# Patient Record
Sex: MALE | Race: WHITE | NOT HISPANIC OR LATINO | URBAN - METROPOLITAN AREA
[De-identification: names, ages, dates, MRNs, and addresses within clinical notes are randomized per-mention and may not be internally consistent; named-entity substitution may affect disease eponyms.]

---

## 2018-06-12 ENCOUNTER — RECORDS - HEALTHEAST (OUTPATIENT)
Dept: LAB | Facility: CLINIC | Age: 43
End: 2018-06-12

## 2018-06-12 LAB
ALBUMIN SERPL-MCNC: 4.3 G/DL (ref 3.5–5)
ALP SERPL-CCNC: 49 U/L (ref 45–120)
ALT SERPL W P-5'-P-CCNC: 44 U/L (ref 0–45)
ANION GAP SERPL CALCULATED.3IONS-SCNC: 10 MMOL/L (ref 5–18)
AST SERPL W P-5'-P-CCNC: 29 U/L (ref 0–40)
BILIRUB SERPL-MCNC: 0.5 MG/DL (ref 0–1)
BUN SERPL-MCNC: 15 MG/DL (ref 8–22)
CALCIUM SERPL-MCNC: 9.9 MG/DL (ref 8.5–10.5)
CHLORIDE BLD-SCNC: 106 MMOL/L (ref 98–107)
CHOLEST SERPL-MCNC: 167 MG/DL
CO2 SERPL-SCNC: 24 MMOL/L (ref 22–31)
CREAT SERPL-MCNC: 0.95 MG/DL (ref 0.7–1.3)
FASTING STATUS PATIENT QL REPORTED: ABNORMAL
GFR SERPL CREATININE-BSD FRML MDRD: >60 ML/MIN/1.73M2
GLUCOSE BLD-MCNC: 88 MG/DL (ref 70–125)
HDLC SERPL-MCNC: 39 MG/DL
LDLC SERPL CALC-MCNC: 108 MG/DL
POTASSIUM BLD-SCNC: 4.2 MMOL/L (ref 3.5–5)
PROT SERPL-MCNC: 7 G/DL (ref 6–8)
SODIUM SERPL-SCNC: 140 MMOL/L (ref 136–145)
TRIGL SERPL-MCNC: 100 MG/DL

## 2021-03-31 ENCOUNTER — IMMUNIZATION (OUTPATIENT)
Dept: FAMILY MEDICINE | Facility: CLINIC | Age: 46
End: 2021-03-31
Payer: COMMERCIAL

## 2021-03-31 PROCEDURE — 0011A PR COVID VAC MODERNA 100 MCG/0.5 ML IM: CPT

## 2021-03-31 PROCEDURE — 91301 PR COVID VAC MODERNA 100 MCG/0.5 ML IM: CPT

## 2021-04-28 ENCOUNTER — IMMUNIZATION (OUTPATIENT)
Dept: FAMILY MEDICINE | Facility: CLINIC | Age: 46
End: 2021-04-28
Attending: FAMILY MEDICINE
Payer: COMMERCIAL

## 2021-04-28 PROCEDURE — 0012A PR COVID VAC MODERNA 100 MCG/0.5 ML IM: CPT

## 2021-04-28 PROCEDURE — 91301 PR COVID VAC MODERNA 100 MCG/0.5 ML IM: CPT

## 2023-08-21 ENCOUNTER — LAB REQUISITION (OUTPATIENT)
Dept: LAB | Facility: CLINIC | Age: 48
End: 2023-08-21

## 2023-08-21 DIAGNOSIS — I10 ESSENTIAL (PRIMARY) HYPERTENSION: ICD-10-CM

## 2023-08-21 LAB
ALBUMIN SERPL BCG-MCNC: 4.9 G/DL (ref 3.5–5.2)
ALP SERPL-CCNC: 48 U/L (ref 40–129)
ALT SERPL W P-5'-P-CCNC: 66 U/L (ref 0–70)
ANION GAP SERPL CALCULATED.3IONS-SCNC: 14 MMOL/L (ref 7–15)
AST SERPL W P-5'-P-CCNC: 42 U/L (ref 0–45)
BILIRUB SERPL-MCNC: 0.4 MG/DL
BUN SERPL-MCNC: 17.6 MG/DL (ref 6–20)
CALCIUM SERPL-MCNC: 9.6 MG/DL (ref 8.6–10)
CHLORIDE SERPL-SCNC: 106 MMOL/L (ref 98–107)
CHOLEST SERPL-MCNC: 205 MG/DL
CREAT SERPL-MCNC: 0.98 MG/DL (ref 0.67–1.17)
DEPRECATED HCO3 PLAS-SCNC: 22 MMOL/L (ref 22–29)
GFR SERPL CREATININE-BSD FRML MDRD: >90 ML/MIN/1.73M2
GLUCOSE SERPL-MCNC: 85 MG/DL (ref 70–99)
HDLC SERPL-MCNC: 48 MG/DL
LDLC SERPL CALC-MCNC: 125 MG/DL
NONHDLC SERPL-MCNC: 157 MG/DL
POTASSIUM SERPL-SCNC: 3.9 MMOL/L (ref 3.4–5.3)
PROT SERPL-MCNC: 7.3 G/DL (ref 6.4–8.3)
SODIUM SERPL-SCNC: 142 MMOL/L (ref 136–145)
TRIGL SERPL-MCNC: 161 MG/DL

## 2023-08-21 PROCEDURE — 80053 COMPREHEN METABOLIC PANEL: CPT | Performed by: FAMILY MEDICINE

## 2023-08-21 PROCEDURE — 80061 LIPID PANEL: CPT | Performed by: FAMILY MEDICINE

## 2024-09-19 ENCOUNTER — TRANSFERRED RECORDS (OUTPATIENT)
Dept: HEALTH INFORMATION MANAGEMENT | Facility: CLINIC | Age: 49
End: 2024-09-19
Payer: COMMERCIAL

## 2024-10-07 ENCOUNTER — MEDICAL CORRESPONDENCE (OUTPATIENT)
Dept: HEALTH INFORMATION MANAGEMENT | Facility: CLINIC | Age: 49
End: 2024-10-07
Payer: COMMERCIAL

## 2024-10-07 ENCOUNTER — TRANSCRIBE ORDERS (OUTPATIENT)
Dept: OTHER | Age: 49
End: 2024-10-07

## 2024-10-07 DIAGNOSIS — M54.31 RIGHT SIDED SCIATICA: Primary | ICD-10-CM

## 2024-10-21 NOTE — PROGRESS NOTES
ASSESSMENT:  Salvatore Crump is a 49 year old male presents for consultation at the request of Taiwo Ortiz who presents today for new patient evaluation of :         Diagnoses and all orders for this visit:  Spinal stenosis of lumbar region with neurogenic claudication  -     Adult Neurosurgery  Referral; Future  Weakness of right leg  -     Adult Neurosurgery  Referral; Future     Patient presents with atraumatic acute on chronic right sciatica for 6 months. Despite having a history of chronic sciatica, this time he is also presenting with right ankle weakness and foot numbness which are both new in the past 6 months. His exam is notable for weakness for right ankle and great toe dorsiflexion (4-/5) and right ankle plantarflexion (4/5). Pain is currently well controlled, SLR is negative today. Reflexes are symmetric with no upper motor neuron signs. Recent lumbar MRI shows central and subarticular stenosis which are moderate to severe at L2-3 and L3-4, with cauda equina impingement at both levels. Also shows a moderate subarticular encroachment of the right L5 root at L4-5. Clinical presentation is consistent with L4-5/S1 radiculopathy with motor deficit. Given the presence of motor deficit will refer patient to Neurosurgery for an urgent outpatient evaluation.  Care coordination done with neurosurgery team and patient will be seen tomorrow.    PLAN:  Reviewed spine anatomy and disease process. Discussed diagnosis and treatment options with the patient today. A shared decision making model was used. The patient's values and choices were respected. The following represents what was discussed and decided upon by the provider and the patient.    1. DIAGNOSTIC TESTS  No new imaging orders at this time.    2. PHYSICAL THERAPY  Will defer until he is seen by Neurosurgery.   Discussed the importance of core strengthening, ROM, stretching exercises with the patient and how each of these entities is  important in decreasing pain.  Explained to the patient that the purpose of physical therapy is to teach the patient a home exercise program.  These exercises need to be performed every day in order to decrease pain and prevent future occurrences of pain.  Likened it to brushing one's teeth.      3. MEDICATIONS:  Discussed multiple medication options today with patient. Discussed risks, side effects, and proper use of medications. Patient verbalized understanding.  No new medications ordered at this visit.  Patient advised to call our clinic's nurse navigation line (number provided) if they experience any side effects or intolerances with prescribed medication.    4. INTERVENTIONS:  No interventions are recommended at this time pending neurosurgical evaluation    5. OTHER REFERRALS:  A referral was placed to Neurosurgery due to right ankle dorsiflexion and plantarflexion weakness in the setting of sciatica.     6. FOLLOW-UP  After Neurosurgery evaluation.     Advised patient to call the Spine Center if symptoms worsen or you have problems controlling bladder and bowel function.   ______________________________________________________________________    SUBJECTIVE:   Salvatore Crump  is a 49 year old male who presents today for new patient evaluation due to right sciatica for 6 months. Symptoms started when patient was trying to move a couch. He had similar symptoms before but it is more severe now. Reports pain starting in the right buttocks and going down to the posterior leg. Has associated numbness in the bottom of the right foot. Also noticed weakness for right foot dorsiflexion that is interfering with his gait. Tried a couple sessions of chiropractor with no improvement. 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 No bowel.bladder changes.     Severity: Pain is currently mild   Laterality: Right   Radiation: From gluteus to the right posterior leg   Worse with: Walking  "  Better with: Rest   Numbness: Bottom of the right foot   Weakness: Yes, for right ankle dorsiflexion and plantarflexion     No prior back surgeries    -Treatment to Date: a few sessions of chiropractor       Oswestry (FABBY) Questionnaire         No data to display                Neck Disability Index:       No data to display                       -Medications:    Current Outpatient Medications   Medication Sig Dispense Refill    amLODIPine (NORVASC) 10 MG tablet Take 10 mg by mouth daily.      citalopram (CELEXA) 10 MG tablet Take 10 mg by mouth daily.       No current facility-administered medications for this visit.       No Known Allergies    No past medical history on file.     There is no problem list on file for this patient.      No past surgical history on file.    No family history on file.    Reviewed past medical, surgical, and family history with patient found on new patient intake packet located in EMR Media tab.     SOCIAL HX: Reviewed    ROS: Specifically negative for bowel/bladder dysfunction, balance changes, headache, dizziness, foot drop, fevers, chills, appetite changes, nausea/vomiting, unexplained weight loss. Otherwise 13 systems reviewed are negative. Please see the patient's intake questionnaire from today for details.    OBJECTIVE:  BP (!) 162/75 (BP Location: Left arm, Patient Position: Sitting, Cuff Size: Adult Large)   Pulse 78   Ht 6' 1\" (1.854 m)   Wt 285 lb (129.3 kg)   SpO2 97%   BMI 37.60 kg/m      PHYSICAL EXAMINATION:  --CONSTITUTIONAL: Vital signs as above. No acute distress. The patient is well nourished and well groomed.  --PSYCHIATRIC: The patient is awake, alert, oriented to person, place, time and answering questions appropriately with clear speech. Appropriate mood and affect   --RESPIRATORY: Normal rhythm and effort. No abnormal accessory muscle breathing patterns noted.   --GROSS MOTOR: Easily arises from a seated position.  --LUMBAR SPINE: Inspection reveals no " evidence of deformity. Range of motion is not limited in flexion, extension, lateral rotation. Mild tenderness to palpation of lumbar paraspinals, no tenderness in spinous processes.  Facet loading (West test) negative, SLR negative  --HIPS: Full range of motion bilaterally. Negative DILIP and FADIR test.  --LOWER EXTREMITY MOTOR TESTING:  Hip flexion left 5/5, right 5/5  Knee extension left 5/5, right 5/5  Ankle dorsiflexors left 5/5, right 4-/5  Ankle plantarflexors left 5/5, right 4/5   Great toe extension left 5/5, right 4-/5   Knee flexion left 5/5, right 5/5  --NEUROLOGIC: Sensation to lower extremities is intact bilaterally in L2-S1 dermatomes. Reflexes intact in BLE (Patellar +2 bilateral, Achilles trace bilateral), no clonus.  --VASCULAR: Warm upper limbs bilaterally. Capillary refill in the upper extremities is less than 1 second.    RESULTS: Available medical records from [facility] and any other outside records were reviewed today.     Imaging:  Available relevant imaging was personally reviewed and interpreted today. The images were shown to the patient and the findings were explained using a spine model.    Lumbar MRI 09/27/2024 - external   Central and subarticular stenosis which are moderate to severe at L2-3 and L3-4, with cauda equina impingement at both levels.  Moderate subarticular encroachment of the right L5 root at L4-5.  Foraminal stenosis, mild to moderate on the right L2-3 and L4-5, with encroachment of the exiting nerve roots.

## 2024-10-22 ENCOUNTER — OFFICE VISIT (OUTPATIENT)
Dept: PHYSICAL MEDICINE AND REHAB | Facility: CLINIC | Age: 49
End: 2024-10-22
Attending: FAMILY MEDICINE
Payer: COMMERCIAL

## 2024-10-22 VITALS
HEIGHT: 73 IN | WEIGHT: 285 LBS | HEART RATE: 78 BPM | DIASTOLIC BLOOD PRESSURE: 75 MMHG | OXYGEN SATURATION: 97 % | BODY MASS INDEX: 37.77 KG/M2 | SYSTOLIC BLOOD PRESSURE: 162 MMHG

## 2024-10-22 DIAGNOSIS — M48.062 SPINAL STENOSIS OF LUMBAR REGION WITH NEUROGENIC CLAUDICATION: Primary | ICD-10-CM

## 2024-10-22 DIAGNOSIS — R29.898 WEAKNESS OF RIGHT LEG: ICD-10-CM

## 2024-10-22 PROCEDURE — 99203 OFFICE O/P NEW LOW 30 MIN: CPT | Mod: HN | Performed by: STUDENT IN AN ORGANIZED HEALTH CARE EDUCATION/TRAINING PROGRAM

## 2024-10-22 RX ORDER — AMLODIPINE BESYLATE 10 MG/1
10 TABLET ORAL DAILY
COMMUNITY

## 2024-10-22 RX ORDER — CITALOPRAM HYDROBROMIDE 10 MG/1
10 TABLET ORAL DAILY
COMMUNITY

## 2024-10-22 ASSESSMENT — PAIN SCALES - GENERAL: PAINLEVEL: MILD PAIN (2)

## 2024-10-22 NOTE — LETTER
10/22/2024      Salvatore Crump  [address]  [place] [address]      Dear Colleague,    Thank you for referring your patient, Salvatoer Crump, to the [facility]. Please see a copy of my visit note below.    ASSESSMENT:  Salvatore Crump is a 49 year old male presents for consultation at the request of Taiwo Ortiz who presents today for new patient evaluation of :         Diagnoses and all orders for this visit:  Spinal stenosis of lumbar region with neurogenic claudication  -     Adult Neurosurgery  Referral; Future  Weakness of right leg  -     Adult Neurosurgery  Referral; Future     Patient presents with atraumatic acute on chronic right sciatica for 6 months. Despite having a history of chronic sciatica, this time he is also presenting with right ankle weakness and foot numbness which are both new in the past 6 months. His exam is notable for weakness for right ankle and great toe dorsiflexion (4-/5) and right ankle plantarflexion (4/5). Pain is currently well controlled, SLR is negative today. Reflexes are symmetric with no upper motor neuron signs. Recent lumbar MRI shows central and subarticular stenosis which are moderate to severe at L2-3 and L3-4, with cauda equina impingement at both levels. Also shows a moderate subarticular encroachment of the right L5 root at L4-5. Clinical presentation is consistent with L4-5/S1 radiculopathy with motor deficit. Given the presence of motor deficit will refer patient to Neurosurgery for an urgent outpatient evaluation.  Care coordination done with neurosurgery team and patient will be seen tomorrow.    PLAN:  Reviewed spine anatomy and disease process. Discussed diagnosis and treatment options with the patient today. A shared decision making model was used. The patient's values and choices were respected. The following represents what was discussed and decided upon by the provider and the patient.    1.  DIAGNOSTIC TESTS  No new imaging orders at this time.    2. PHYSICAL THERAPY  Will defer until he is seen by Neurosurgery.   Discussed the importance of core strengthening, ROM, stretching exercises with the patient and how each of these entities is important in decreasing pain.  Explained to the patient that the purpose of physical therapy is to teach the patient a home exercise program.  These exercises need to be performed every day in order to decrease pain and prevent future occurrences of pain.  Likened it to brushing one's teeth.      3. MEDICATIONS:  Discussed multiple medication options today with patient. Discussed risks, side effects, and proper use of medications. Patient verbalized understanding.  No new medications ordered at this visit.  Patient advised to call our clinic's nurse navigation line (number provided) if they experience any side effects or intolerances with prescribed medication.    4. INTERVENTIONS:  No interventions are recommended at this time pending neurosurgical evaluation    5. OTHER REFERRALS:  A referral was placed to Neurosurgery due to right ankle dorsiflexion and plantarflexion weakness in the setting of sciatica.     6. FOLLOW-UP  After Neurosurgery evaluation.     Advised patient to call the Spine Center if symptoms worsen or you have problems controlling bladder and bowel function.   ______________________________________________________________________    SUBJECTIVE:   Salvatore Crump  is a 49 year old male who presents today for new patient evaluation due to right sciatica for 6 months. Symptoms started when patient was trying to move a couch. He had similar symptoms before but it is more severe now. Reports pain starting in the right buttocks and going down to the posterior leg. Has associated numbness in the bottom of the right foot. Also noticed weakness for right foot dorsiflexion that is interfering with his gait. Tried a couple sessions of chiropractor with no  "improvement. 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 No bowel.bladder changes.     Severity: Pain is currently mild   Laterality: Right   Radiation: From gluteus to the right posterior leg   Worse with: Walking   Better with: Rest   Numbness: Bottom of the right foot   Weakness: Yes, for right ankle dorsiflexion and plantarflexion     No prior back surgeries    -Treatment to Date: a few sessions of chiropractor       Oswestry (FABBY) Questionnaire         No data to display                Neck Disability Index:       No data to display                       -Medications:    Current Outpatient Medications   Medication Sig Dispense Refill     amLODIPine (NORVASC) 10 MG tablet Take 10 mg by mouth daily.       citalopram (CELEXA) 10 MG tablet Take 10 mg by mouth daily.       No current facility-administered medications for this visit.       No Known Allergies    No past medical history on file.     There is no problem list on file for this patient.      No past surgical history on file.    No family history on file.    Reviewed past medical, surgical, and family history with patient found on new patient intake packet located in EMR Media tab.     SOCIAL HX: Reviewed    ROS: Specifically negative for bowel/bladder dysfunction, balance changes, headache, dizziness, foot drop, fevers, chills, appetite changes, nausea/vomiting, unexplained weight loss. Otherwise 13 systems reviewed are negative. Please see the patient's intake questionnaire from today for details.    OBJECTIVE:  BP (!) 162/75 (BP Location: Left arm, Patient Position: Sitting, Cuff Size: Adult Large)   Pulse 78   Ht 6' 1\" (1.854 m)   Wt 285 lb (129.3 kg)   SpO2 97%   BMI 37.60 kg/m      PHYSICAL EXAMINATION:  --CONSTITUTIONAL: Vital signs as above. No acute distress. The patient is well nourished and well groomed.  --PSYCHIATRIC: The patient is awake, alert, oriented to person, place, time and answering questions " appropriately with clear speech. Appropriate mood and affect   --RESPIRATORY: Normal rhythm and effort. No abnormal accessory muscle breathing patterns noted.   --GROSS MOTOR: Easily arises from a seated position.  --LUMBAR SPINE: Inspection reveals no evidence of deformity. Range of motion is not limited in flexion, extension, lateral rotation. Mild tenderness to palpation of lumbar paraspinals, no tenderness in spinous processes.  Facet loading (West test) negative, SLR negative  --HIPS: Full range of motion bilaterally. Negative DILIP and FADIR test.  --LOWER EXTREMITY MOTOR TESTING:  Hip flexion left 5/5, right 5/5  Knee extension left 5/5, right 5/5  Ankle dorsiflexors left 5/5, right 4-/5  Ankle plantarflexors left 5/5, right 4/5   Great toe extension left 5/5, right 4-/5   Knee flexion left 5/5, right 5/5  --NEUROLOGIC: Sensation to lower extremities is intact bilaterally in L2-S1 dermatomes. Reflexes intact in BLE (Patellar +2 bilateral, Achilles trace bilateral), no clonus.  --VASCULAR: Warm upper limbs bilaterally. Capillary refill in the upper extremities is less than 1 second.    RESULTS: Available medical records from [facility] and any other outside records were reviewed today.     Imaging:  Available relevant imaging was personally reviewed and interpreted today. The images were shown to the patient and the findings were explained using a spine model.    Lumbar MRI 09/27/2024 - external   Central and subarticular stenosis which are moderate to severe at L2-3 and L3-4, with cauda equina impingement at both levels.  Moderate subarticular encroachment of the right L5 root at L4-5.  Foraminal stenosis, mild to moderate on the right L2-3 and L4-5, with encroachment of the exiting nerve roots.       Attestation with edits by Jordan Thomas MD at 10/22/2024 11:55 AM:  Physician Attestation   I, Jordan Thomas MD, saw and evaluated this patient and agree with the findings and plan of care as documented in  the note.  Any changes have been made above.    Jordan Thomas MD  [facility]      Again, thank you for allowing me to participate in the care of your patient.        Sincerely,        Jordan Thomas MD

## 2024-10-22 NOTE — PATIENT INSTRUCTIONS
~Spine Center Scheduling #[phone].  ~Please call our [facility] Spine Nurse Navigation #[phone] with any questions or concerns about your treatment plan, if symptoms worsen and you would like to be seen urgently, or if you have problems controlling bladder and bowel function.  ~For any future flareups or new symptoms, recommend follow-up in clinic or contact the nurse navigator line.  ~Please note that any My Chart messages may take multiple days for a response due to the high volume of patients seen in clinic.  Anything sent Friday night or after will be answered the following week when able.

## 2024-10-23 ENCOUNTER — OFFICE VISIT (OUTPATIENT)
Dept: NEUROSURGERY | Facility: CLINIC | Age: 49
End: 2024-10-23
Attending: STUDENT IN AN ORGANIZED HEALTH CARE EDUCATION/TRAINING PROGRAM
Payer: COMMERCIAL

## 2024-10-23 VITALS — HEIGHT: 73 IN | BODY MASS INDEX: 37.77 KG/M2 | WEIGHT: 285 LBS

## 2024-10-23 DIAGNOSIS — R29.898 WEAKNESS OF RIGHT LEG: ICD-10-CM

## 2024-10-23 DIAGNOSIS — M48.062 SPINAL STENOSIS OF LUMBAR REGION WITH NEUROGENIC CLAUDICATION: ICD-10-CM

## 2024-10-23 PROCEDURE — 99203 OFFICE O/P NEW LOW 30 MIN: CPT | Performed by: SURGERY

## 2024-10-23 ASSESSMENT — PAIN SCALES - GENERAL: PAINLEVEL_OUTOF10: NO PAIN (0)

## 2024-10-23 NOTE — PATIENT INSTRUCTIONS
Recommend hemilaminectomies at L2-3 and L3-4 and L4-5 on the right with a foraminotomy at L4-5 on the right.       Please review COMPLETE information about your proposed surgery, pre-operative requirements, post-operative course and expectations - available in a folder provided to you in clinic!      Pre-Operative    Pre-operative physical / Lab work with primary care physician within 30 days of surgical date. We prefer the pre-op exam to be done 2 weeks prior to surgery. We also prefer pre-op lab work be done at [facility] outpatient lab, 2 weeks prior to surgery.     If all pre-op appointments/test are not completed prior to your surgery date, you will be asked to reschedule your surgery.           As part of preparation for your upcoming procedure your primary care doctor may order a test to rule out a COVID-19 infection. This is no longer a requirement prior to surgery.     Readiness Visits    Prior to surgery, you may have a telephone or in person readiness visit with our RN team to discuss your upcomming surgery, results of your pre-op physical, and lab work.   If you will require a collar/neck brace after surgery, you will be fitted for one at your readiness visit prior to surgery (scheduled by the surgery scheduler).     Shower procedure    Hibiclens shower: Use one packet the night before surgery and one packet the morning of surgery for a whole body shower. Avoid face, hair, and genitals.      Eating/Drinking    Stop all solid foods 8 hours before surgery.  Stop all clear liquids 2 hours prior to arrival time     Clear liquids include water, clear juice, black coffee, or clear tea without milk, Gatorade, clear soda.     Medications - please refer to the pre-operative medication instructions sheet in your folder    Hold Aspirin, NSAIDs (Advil/Ibuprofen, Indocin, Naproxen,Nuprin,Relafen/Nabumetone, Diclofenac,Meloxicam, Aleve, Celebrex) and all vitamins and supplements x 7-10 days prior  to surgical date  You can take Tylenol (Acetaminophen) for pain up until the date of your surgery   Do not exceed 3,000 mg per day   Any other medications prescribed, please discuss with your primary care provider at your pre-operative physical. Please discuss when/if it is safe for you to stop taking blood thinners with your primary care provider.   We will NOT provide pain medications prior to surgery. We will prescribe post-op pain medications for up to 6 weeks after surgery.       FMLA/Short-term disability    If you are currently employed, you will likely need to be off work for 4-6 weeks for post-op recovery and healing.  Please fax any FMLA/short term disability paperwork to [phone], mail it into the clinic, drop it off in person, or send via a [employer] message.   You may also call our clinic with the date in which you'd like to return to work, and we can provide a work letter to your employer  We will support Short-Term Disability up to 12 weeks, beginning the date of your surgery. We do NOT support Long-Term Disability/Social Security Benefits.     Pain Management after surgery    Dealing with pain    As your body heals, you might feel a stabbing, burning, or aching pain. You may also have some numbness.  Everyone feels pain differently, we may ask you to rate your pain using a pain scale. This will let us know how much pain you feel.   Keep in mind that medicine won't take away all of your pain. It helps to try other ways to relax and ease pain.     Things to help with pain    After surgery, we will give you medicine for your pain. These medications work well, but they can make you drowsy, itchy, or sick to your stomach, and constipated. Try to take narcotics with food if they cause nausea.   For mild to moderate pain, you can take medication such as Tylenol or Ibuprofen. These can be used with narcotics and muscle relaxants. *If you have had a fusion: do NOT use NSAIDs for 6 months after surgery.   Do  NOT drive while taking narcotic pain medication  Do NOT drink alcohol while using narcotic pain medication  You can utilize ice as needed (no longer than 20 minutes at one time) you may apply this over your covered incision  Utilize heat for muscle spasms, do not apply heat over your incision  If a muscle relaxer is prescribed, please do NOT take it at the same time as your narcotic pain medication. Take them at least 90 minutes apart.   You may also use pain cream/patches on sore muscles. Do NOT apply these on your incision. Patches may be cut in 1/2 if needed.     *After your surgery, if you will be staying in-patient, a nursing team will be monitoring you closely throughout your stay and communicate your health status to your surgeon and other providers.  You will be seen by Advanced Practice Providers (e.g., nurse practitioners, clinic nurse specialists, and physician assistants) who will check on you regularly to assess the status of your surgical recovery.     Incision Care    Look at your incision site every day. You  may need a mirror, or family member to help you.   Do not submerge your incision in water such as pools, hot tubs, baths for at least 6 weeks or until incision is healed  You may get your incision wet in the shower. Allow water and soap to run over incision, and gently pat dry.   Remove the dressing the day after you are discharged from the hospital. Keep the incision clean and dry at all times. This may require several bandage changes.   Contact us right away if you have:   Fever over 101 degrees farenheit  Green or yellow drainage (pus) from your incision or increased bloody drainage   Redness, swelling, or warmth at your surgery site   Notify the clinic [phone].    Activity Restrictions    For the first 6 weeks, no lifting,pushing, or pulling > 5-10 pounds, no bending, twisting.  Use the stairs in moderation   Walking: Walking is the best way to start exercise after surgery. Take short  frequent walks. You may gradually increase the distance as tolerated. If you feel pain, decrease your activity, but DO NOT stop walking. Walking will help you regain strength.  Avoid prolonged positioning for longer than 30 minutes (change positions frequently while awake)  No contact sports until after follow up visit  No high impact activities such as; running/jogging, snowmobile or 4 hernandez riding or any other recreational vehicles until deemed safe by your surgeon/care team.   Please call the clinic if you develop any of the following symptoms:  Swelling and/or warmth in one or both legs  Pain or tenderness in your leg, ankle, foot, or arm   Red or discolored/pale skin     Post-Op Follow Up Appointments    We will call you to schedule these appointments after your discharge from the hospital.   Incision assessment within 2 weeks with a Registered Nurse   6 week post-op with a Nurse Practitioner/Physician Assistant. Your surgeon will be available on this day.

## 2024-10-23 NOTE — PROGRESS NOTES
The patient is a 49-year-old male.  He is here with his wife.  He was referred by Dr. Thomas.  The patient complains of weakness and numbness in his right foot.  It is more noticeable after he walks a while and interferes with his ability to keep walking.  He does not have trouble with the left.  He gets intermittent pain into the right buttock and into the right leg.  No trouble with bladder control.  The weakness and numbness are more of a problem for him than the pain.  He is otherwise in good health with no heart disease, lung disease, cancer, or diabetes.  On MRI he has canal and lateral recess stenosis at L2-3 and L3-4 and L4-5 on the right.  I showed the pictures to him and his wife and also showed them a model spine.  We talked about doing hemilaminectomies at L2-3 and L3-4 and L4-5 on the right with a foraminotomy at L4-5 on the right.  We included in the discussion the nature of the problem, nature of the surgery, rationale for doing it, goals, benefits, alternatives, and significant risks and complications.  I answered all their questions.  They said they were satisfied with the explanation and understood.  They are going to go home and discuss the situation.  They are going to give us a call if they want to go ahead with surgery.  They are satisfied with the plan.  Total time 30 minutes, more than 50% spent counseling and/or coordinating care.

## 2024-10-23 NOTE — NURSING NOTE
"Salvatore Crump is a 49 year old male who presents for:  Chief Complaint   Patient presents with    Consult     Spinal stenosis of lumbar region with neurogenic claudication. Patient reports no pain, but weakness reported in toes of right foot. Numbness on bottom of right foot.        Initial Vitals:  Ht 6' 1\" (1.854 m)   Wt 285 lb (129.3 kg)   BMI 37.60 kg/m   Estimated body mass index is 37.6 kg/m  as calculated from the following:    Height as of this encounter: 6' 1\" (1.854 m).    Weight as of this encounter: 285 lb (129.3 kg). Body surface area is 2.58 meters squared. BP completed using cuff size: NA (Not Taken)  No Pain (0)    Patient refused BP check today as they had just taken it yesterday.    Trenton Manzano    "

## 2024-10-23 NOTE — LETTER
10/23/2024      Salvatore Crump  [address]  [place] [address]      Dear Colleague,    Thank you for referring your patient, Salvatore Crump, to the [facility]. Please see a copy of my visit note below.    The patient is a 49-year-old male.  He is here with his wife.  He was referred by Dr. Thomas.  The patient complains of weakness and numbness in his right foot.  It is more noticeable after he walks a while and interferes with his ability to keep walking.  He does not have trouble with the left.  He gets intermittent pain into the right buttock and into the right leg.  No trouble with bladder control.  The weakness and numbness are more of a problem for him than the pain.  He is otherwise in good health with no heart disease, lung disease, cancer, or diabetes.  On MRI he has canal and lateral recess stenosis at L2-3 and L3-4 and L4-5 on the right.  I showed the pictures to him and his wife and also showed them a model spine.  We talked about doing hemilaminectomies at L2-3 and L3-4 and L4-5 on the right with a foraminotomy at L4-5 on the right.  We included in the discussion the nature of the problem, nature of the surgery, rationale for doing it, goals, benefits, alternatives, and significant risks and complications.  I answered all their questions.  They said they were satisfied with the explanation and understood.  They are going to go home and discuss the situation.  They are going to give us a call if they want to go ahead with surgery.  They are satisfied with the plan.  Total time 30 minutes, more than 50% spent counseling and/or coordinating care.      Again, thank you for allowing me to participate in the care of your patient.        Sincerely,        Guille Soto MD